# Patient Record
Sex: FEMALE | Race: WHITE | URBAN - NONMETROPOLITAN AREA
[De-identification: names, ages, dates, MRNs, and addresses within clinical notes are randomized per-mention and may not be internally consistent; named-entity substitution may affect disease eponyms.]

---

## 2020-11-18 ENCOUNTER — APPOINTMENT (RX ONLY)
Dept: URBAN - NONMETROPOLITAN AREA CLINIC 1 | Facility: CLINIC | Age: 40
Setting detail: DERMATOLOGY
End: 2020-11-18

## 2020-11-18 DIAGNOSIS — Z41.9 ENCOUNTER FOR PROCEDURE FOR PURPOSES OTHER THAN REMEDYING HEALTH STATE, UNSPECIFIED: ICD-10-CM

## 2020-11-18 PROCEDURE — ? BOTOX

## 2020-11-18 NOTE — PROCEDURE: BOTOX
Post-Care Instructions: Patient instructed do not lie down or bend over 4 hours after treatment. Do not manipulate treatment area and limit physical activity for 24 hours. Patient instructed not to travel by airplane for 48 hours. It can take 3-11 days to start seeing results. Please call if you have any concerns.\\n\\nResults with botox take 5 days to appear and improve up to 2 weeks post injection.  \\n\\nCall the clinic if any questions or concerns arise at [phone]. Post-Care Instructions: Patient instructed do not lie down or bend over 4 hours after treatment. Do not manipulate treatment area and limit physical activity for 24 hours. Patient instructed not to travel by airplane for 48 hours. It can take 3-11 days to start seeing results. Please call if you have any concerns.\\n\\nResults with botox take 5 days to appear and improve up to 2 weeks post injection.  \\n\\nCall the clinic if any questions or concerns arise at [phone].

## 2021-01-01 ENCOUNTER — APPOINTMENT (RX ONLY)
Dept: URBAN - NONMETROPOLITAN AREA CLINIC 1 | Facility: CLINIC | Age: 41
Setting detail: DERMATOLOGY
End: 2021-01-01

## 2021-01-01 DIAGNOSIS — Z41.9 ENCOUNTER FOR PROCEDURE FOR PURPOSES OTHER THAN REMEDYING HEALTH STATE, UNSPECIFIED: ICD-10-CM

## 2021-01-01 PROCEDURE — ? DYSPORT

## 2021-02-16 ENCOUNTER — APPOINTMENT (RX ONLY)
Dept: URBAN - NONMETROPOLITAN AREA CLINIC 1 | Facility: CLINIC | Age: 41
Setting detail: DERMATOLOGY
End: 2021-02-16

## 2021-02-16 DIAGNOSIS — Z41.9 ENCOUNTER FOR PROCEDURE FOR PURPOSES OTHER THAN REMEDYING HEALTH STATE, UNSPECIFIED: ICD-10-CM

## 2021-02-16 PROCEDURE — ? BOTOX

## 2021-02-16 NOTE — PROCEDURE: BOTOX
Post-Care Instructions: Patient instructed do not lie down or bend over 4 hours after treatment. Do not manipulate treatment area and limit physical activity for 24 hours. Patient instructed not to travel by airplane for 48 hours. It can take 3-11 days to start seeing results. Please call if you have any concerns.\\n\\nResults with botox take 5 days to appear and improve up to 2 weeks post injection.  \\n\\nCall the clinic if any questions or concerns arise at [phone]. Post-Care Instructions: Patient instructed do not lie down or bend over 4 hours after treatment. Do not manipulate treatment area and limit physical activity for 24 hours. Patient instructed not to travel by airplane for 48 hours. It can take 3-11 days to start seeing results. Please call if you have any concerns.\\n\\nResults with botox take 5 days to appear and improve up to 2 weeks post injection.  \\n\\nCall the clinic if any questions or concerns arise at [phone].

## 2021-06-01 PROBLEM — Z41.9 ENCOUNTER FOR PROCEDURE FOR PURPOSES OTHER THAN REMEDYING HEALTH STATE, UNSPECIFIED: Status: ACTIVE | Noted: 2021-01-01
